# Patient Record
(demographics unavailable — no encounter records)

---

## 2019-01-01 NOTE — PCM.PNNB
- General Info


Date of Service: 19





- Patient Data


Vital Signs: 


 Last Vital Signs











Temp  36.9 C   19 08:00


 


Pulse  142   19 08:00


 


Resp  38   19 08:00


 


BP  56/38   19 11:00


 


Pulse Ox      











Weight: 3.47 kg


I&O Last 24 Hours: 


 Intake & Output











 19





 22:59 06:59 14:59


 


Intake Total  207 


 


Balance  207 











Labs Last 24 Hours: 


 Laboratory Results - last 24 hr











  19 Range/Units





  12:02 12:12 07:14 


 


Sodium     (136-148)  mmol/L


 


Potassium     (3.5-5.1)  mmol/L


 


Chloride     ()  mmol/L


 


Carbon Dioxide     (21.0-32.0)  mmol/L


 


BUN     (7.0-18.0)  mg/dL


 


Creatinine     (0.8-1.3)  mg/dL


 


Est Cr Clr Drug Dosing     


 


Estimated GFR (MDRD)     ml/min


 


Glucose     ()  mg/dL


 


POC Glucose  88 H    (40-80)  mg/dL


 


Calcium     (8.5-10.1)  mg/dL


 


Neonat Total Bilirubin   6.4  7.9  (0.1-12.0)  mg/dL


 


Neonat Direct Bilirubin   0.2  0.1  (0.0-2.0)  mg/dL


 


Neonat Indirect Bili   6.2  7.8  (0.0-10.0)  mg/dL














  19 Range/Units





  07:14 


 


Sodium  144  (136-148)  mmol/L


 


Potassium  4.7  (3.5-5.1)  mmol/L


 


Chloride  110 H  ()  mmol/L


 


Carbon Dioxide  16.0 L  (21.0-32.0)  mmol/L


 


BUN  5 L  (7.0-18.0)  mg/dL


 


Creatinine  0.5 L  (0.8-1.3)  mg/dL


 


Est Cr Clr Drug Dosing  TNP  


 


Estimated GFR (MDRD)  41.4  ml/min


 


Glucose  86  ()  mg/dL


 


POC Glucose   (40-80)  mg/dL


 


Calcium  8.0 L  (8.5-10.1)  mg/dL


 


Neonat Total Bilirubin   (0.1-12.0)  mg/dL


 


Neonat Direct Bilirubin   (0.0-2.0)  mg/dL


 


Neonat Indirect Bili   (0.0-10.0)  mg/dL











Current Medications: 


 Current Medications





Erythromycin (Erythromycin 0.5% Ophth Oint)  1 gm EYEBOTH ONETIME PRN


   PRN Reason: For Delivery


   Last Admin: 19 13:37 Dose:  1 gm


Dextrose/Water (Dextrose 10% In Water)  500 mls @ 8 mls/hr IV ASDIRECTED FEI


Acyclovir 75 mg/ Sodium (Chloride)  12 mls @ 12 mls/hr IV Q8H [facility]


   Last Admin: 19 02:56 Dose:  12 mls/hr


Dextrose/Sodium Chloride (Dextrose 5%-1/4 Ns)  1,000 mls @ 8 mls/hr IV 

ASDIRECTED ONE


   Stop: 19 19:59


   Last Admin: 19 15:53 Dose:  8 mls/hr


Lidocaine HCl (Xylocaine-Mpf 1%)  0 ml INJECT ONETIME PRN


   PRN Reason: Circumcision


Neomycin/Polymyxin/Bacitracin (Triple Antibiotic Oint)  0 gm TOP ASDIRECTED PRN


   PRN Reason: circumcision


Phytonadione (Aquamephyton)  1 mg IM ONETIME PRN


   PRN Reason: For Delivery


   Last Admin: 19 13:44 Dose:  1 mg


Sodium Chloride (Saline Flush)  10 ml FLUSH ASDIRECTED PRN


   PRN Reason: Keep Vein Open


Sodium Chloride (Saline Flush)  2.5 ml FLUSH ASDIRECTED PRN


   PRN Reason: Keep Vein Open


Sodium Chloride (Normal Saline)  10 ml IV ASDIRECTED PRN


   PRN Reason: IV Use


Sucrose (Sweet-Ease Natural)  2 ml PO ASDIRECTED PRN


   PRN Reason: Circimcision





Discontinued Medications





Hepatitis B Vaccine (Recombivax Hb (Pediatric/Adolescent))  5 mcg IM .ONCE ONE


   Stop: 19 11:15


   Last Admin: 19 13:44 Dose:  5 mcg


Acyclovir 75 mg/ Sodium (Chloride)  11.5 mls @ 11.5 mls/hr IV Q8H [facility]


   Last Admin: 19 20:13 Dose:  Not Given


Acyclovir 75 mg/ Sodium (Chloride)  12 mls @ 12 mls/hr IV Q8H [facility]


   Last Admin: 19 20:13 Dose:  Not Given


Acyclovir 75 mg/ Sodium (Chloride)  12 mls @ 12 mls/hr IV Q8H [facility]


   Last Admin: 19 20:14 Dose:  Not Given


Acyclovir 75 mg/ Sodium (Chloride)  12 mls @ 12 mls/hr IV Q8H [facility]











- General/Neuro


Activity: Sleeping


Resting Posture: Flexion





- Exam


Eyes: Bilateral: Normal Inspection


Ears: Normal Appearance, Symmetrical


Nose: Normal Inspection, Normal Mucosa


Mouth: Nnormal Inspection, Palate Intact.  No: Cleft Lip


Chest/Cardiovascular: Normal Appearance, Normal Peripheral Pulses, Regular 

Heart Rate, Symmetrical, Clavicles Intact.  No: Murmur


Respiratory: Lungs Clear, Normal Breath Sounds, No Respiratoy Distress


Abdomen/GI: Normal Bowel Sounds, No Mass, Symmetrical, Soft


Genitalia (Male): Reports: Normal Inspection.  Denies: Undescended Testes, Left

, Undescended Testes, Right


Extremities: Normal Inspection, Normal Capillary Refill, Normal Range of Motion


Skin: Dry, Intact, Warm, Jaundiced (mild)





- Subjective


Note: 





No events overnight. Breastfeeding and taking formula. Voiding and stooling. 





- Problem List & Annotations


(1) Exposure to herpes simplex virus (HSV)


SNOMED Code(s): 912563201241698


   Code(s): Z20.828 - CONTACT W AND EXPOSURE TO OTH VIRAL COMMUNICABLE DISEASES

   Status: Acute   Priority: High   Current Visit: Yes   Onset Date: 19   





(2) Liveborn infant by  delivery


SNOMED Code(s): 763041483, 313920465


   Code(s): Z38.01 - SINGLE LIVEBORN INFANT, DELIVERED BY    Status: 

Acute   Priority: High   Current Visit: Yes   Onset Date: 19   





- Problem List Review


Problem List Initiated/Reviewed/Updated: Yes





- Plan


Plan:: 


Besides routine postpartum care and observation, because of maternal HSV lesion 

being present and rupture of membranes for over 6 hours before delivery, Infant 

will receive HSV cultures of conjunctivae, mouth, nasopharynx, rectum and urine

, and blood HSV PCR.  He will be on IV acyclovir 60 mg/kg/day split into TID 

dosing.  I have discussed this with mother to help her understand that this is 

prophylactic and needed for 3-5 days. 





Plan /3: 


screen blood sugar Q4 ON D10 (notify provider of elevation). If elevated we 

will reduce to d5 1/4 ns at same rate, other wise we will change IVF at 3 pm.   

continue current regimen of IV acyclovir TID split.  No news on culturing 

results at this time.  continue routine cares. 





Plan :


Continue D5 1/4 NS at 55 mL/kg/day. Will decrease rate if possible. Baby is 

feeding well. Normal exam. Bili from yesterday in low risk. Checked BMP today 

normal, will repeat tomorrow given continued IV fluids and check LFTs to look 

for aminotransferase elevation.

## 2019-01-01 NOTE — PCM.PNNB
- General Info


Date of Service: 19





- Patient Data


Vital Signs: 


 Last Vital Signs











Temp  97.8 F   19 08:30


 


Pulse  122   19 08:30


 


Resp  48   19 08:30


 


BP  56/38   19 11:00


 


Pulse Ox      











Weight: 3.47 kg


I&O Last 24 Hours: 


 Intake & Output











 19





 22:59 06:59 14:59


 


Intake Total 40 94 15


 


Balance 40 94 15











Labs Last 24 Hours: 


 Laboratory Results - last 24 hr











  19 Range/Units





  10:30 10:30 13:30 


 


WBC    18.93  (9.0-30.0)  K/uL


 


RBC    5.19  (3.90-7.00)  M/uL


 


Hgb    18.9 H  (5.0-13.0)  g/dL


 


Hct    54.9  (39.0-70.0)  %


 


MCV    105.8  (88.0-123.0)  fL


 


MCH    36.4  (30.0-40.0)  pg


 


MCHC    34.4  (28.0-36.0)  g/dL


 


RDW Std Deviation    61.7  (28.0-62.0)  fl


 


RDW Coeff of Teddy    17 H  (11.0-15.0)  %


 


Plt Count    268  (100-300)  K/uL


 


MPV    10.50  (0..00)  fL


 


Neutrophils % (Manual)    53  (48.0-80.0)  %


 


Band Neutrophils %    7  %


 


Lymphocytes % (Manual)    24  (16.0-40.0)  %


 


Monocytes % (Manual)    11  (2.0-15.0)  %


 


Eosinophils % (Manual)    4  (0.0-7.0)  %


 


Metamyelocytes %    1  %


 


Nucleated RBC %    2.2  /100WBC


 


Absolute Seg Neuts    10.0 H  (1.4-5.7)  


 


Band Neutrophils #    1.3  


 


Lymphocytes # (Manual)    4.5 H  (0.6-2.4)  


 


Monocytes # (Manual)    2.1 H  (0.0-0.8)  


 


Eosinophils # (Manual)    0.8 H  (0.0-0.7)  


 


Absolute Metamyelocyte    0.2  


 


Polychromasia    1+ SLIGHT  


 


Sodium     (136-148)  mmol/L


 


Potassium     (3.5-5.1)  mmol/L


 


Chloride     ()  mmol/L


 


Carbon Dioxide     (21.0-32.0)  mmol/L


 


BUN     (7.0-18.0)  mg/dL


 


Creatinine     (0.8-1.3)  mg/dL


 


Est Cr Clr Drug Dosing     


 


Estimated GFR (MDRD)     ml/min


 


Glucose     ()  mg/dL


 


Calcium     (8.5-10.1)  mg/dL


 


Total Bilirubin     (0.2-12.0)  mg/dL


 


AST     (15-37)  IU/L


 


ALT     (14-63)  IU/L


 


Alkaline Phosphatase     ()  U/L


 


Total Protein     (6.4-8.2)  g/dL


 


Albumin     (3.4-5.0)  g/dL


 


Globulin     (2.6-4.0)  g/dL


 


Albumin/Globulin Ratio     (0.9-1.6)  


 


Cord Blood Type  O POSITIVE    


 


MONY, Poly Interpret   NEGATIVE   (NEGATIVE)  














  19 Range/Units





  13:30 


 


WBC   (9.0-30.0)  K/uL


 


RBC   (3.90-7.00)  M/uL


 


Hgb   (5.0-13.0)  g/dL


 


Hct   (39.0-70.0)  %


 


MCV   (88.0-123.0)  fL


 


MCH   (30.0-40.0)  pg


 


MCHC   (28.0-36.0)  g/dL


 


RDW Std Deviation   (28.0-62.0)  fl


 


RDW Coeff of Teddy   (11.0-15.0)  %


 


Plt Count   (100-300)  K/uL


 


MPV   (0..00)  fL


 


Neutrophils % (Manual)   (48.0-80.0)  %


 


Band Neutrophils %   %


 


Lymphocytes % (Manual)   (16.0-40.0)  %


 


Monocytes % (Manual)   (2.0-15.0)  %


 


Eosinophils % (Manual)   (0.0-7.0)  %


 


Metamyelocytes %   %


 


Nucleated RBC %   /100WBC


 


Absolute Seg Neuts   (1.4-5.7)  


 


Band Neutrophils #   


 


Lymphocytes # (Manual)   (0.6-2.4)  


 


Monocytes # (Manual)   (0.0-0.8)  


 


Eosinophils # (Manual)   (0.0-0.7)  


 


Absolute Metamyelocyte   


 


Polychromasia   


 


Sodium  138  (136-148)  mmol/L


 


Potassium  4.8  (3.5-5.1)  mmol/L


 


Chloride  105  ()  mmol/L


 


Carbon Dioxide  23.2  (21.0-32.0)  mmol/L


 


BUN  11  (7.0-18.0)  mg/dL


 


Creatinine  0.8  (0.8-1.3)  mg/dL


 


Est Cr Clr Drug Dosing  TNP  


 


Estimated GFR (MDRD)  25.9  ml/min


 


Glucose  57 L  ()  mg/dL


 


Calcium  9.9  (8.5-10.1)  mg/dL


 


Total Bilirubin  2.9  (0.2-12.0)  mg/dL


 


AST  50 H  (15-37)  IU/L


 


ALT  18  (14-63)  IU/L


 


Alkaline Phosphatase  129 H  ()  U/L


 


Total Protein  6.5  (6.4-8.2)  g/dL


 


Albumin  3.4  (3.4-5.0)  g/dL


 


Globulin  3.1  (2.6-4.0)  g/dL


 


Albumin/Globulin Ratio  1.1  (0.9-1.6)  


 


Cord Blood Type   


 


MONY, Poly Interpret   (NEGATIVE)  











Current Medications: 


 Current Medications





Erythromycin (Erythromycin 0.5% Ophth Oint)  1 gm EYEBOTH ONETIME PRN


   PRN Reason: For Delivery


   Last Admin: 19 13:37 Dose:  1 gm


Dextrose/Water (Dextrose 10% In Water)  500 mls @ 8 mls/hr IV ASDIRECTED FEI


Acyclovir 75 mg/ Sodium (Chloride)  12 mls @ 12 mls/hr IV Q8H [facility]


   Last Admin: 19 10:48 Dose:  12 mls/hr


Lidocaine HCl (Xylocaine-Mpf 1%)  0 ml INJECT ONETIME PRN


   PRN Reason: Circumcision


Neomycin/Polymyxin/Bacitracin (Triple Antibiotic Oint)  0 gm TOP ASDIRECTED PRN


   PRN Reason: circumcision


Phytonadione (Aquamephyton)  1 mg IM ONETIME PRN


   PRN Reason: For Delivery


   Last Admin: 19 13:44 Dose:  1 mg


Sodium Chloride (Saline Flush)  10 ml FLUSH ASDIRECTED PRN


   PRN Reason: Keep Vein Open


Sodium Chloride (Saline Flush)  2.5 ml FLUSH ASDIRECTED PRN


   PRN Reason: Keep Vein Open


Sodium Chloride (Normal Saline)  10 ml IV ASDIRECTED PRN


   PRN Reason: IV Use


Sucrose (Sweet-Ease Natural)  2 ml PO ASDIRECTED PRN


   PRN Reason: Circimcision





Discontinued Medications





Hepatitis B Vaccine (Recombivax Hb (Pediatric/Adolescent))  5 mcg IM .ONCE ONE


   Stop: 19 11:15


   Last Admin: 19 13:44 Dose:  5 mcg


Acyclovir 75 mg/ Sodium (Chloride)  11.5 mls @ 11.5 mls/hr IV Q8H [facility]


   Last Admin: 19 20:13 Dose:  Not Given


Acyclovir 75 mg/ Sodium (Chloride)  12 mls @ 12 mls/hr IV Q8H [facility]


   Last Admin: 19 20:13 Dose:  Not Given


Acyclovir 75 mg/ Sodium (Chloride)  12 mls @ 12 mls/hr IV Q8H [facility]


   Last Admin: 19 20:14 Dose:  Not Given


Acyclovir 75 mg/ Sodium (Chloride)  12 mls @ 12 mls/hr IV Q8H [facility]











- General/Neuro


Activity: Sleeping


Resting Posture: Flexion





- Exam


Eyes: Bilateral: Normal Inspection


Ears: Normal Appearance, Symmetrical


Nose: Normal Inspection, Normal Mucosa


Mouth: Nnormal Inspection, Palate Intact


Chest/Cardiovascular: Normal Appearance, Normal Peripheral Pulses, Regular 

Heart Rate, Symmetrical


Respiratory: Lungs Clear, Normal Breath Sounds, No Respiratoy Distress


Abdomen/GI: Normal Bowel Sounds, No Mass, Pelvis Stable, Symmetrical, Soft


Genitalia (Male): Reports: Normal Inspection


Extremities: Normal Inspection, Normal Capillary Refill, Normal Range of Motion


Skin: Dry, Intact, Normal Color, Warm





- Subjective


Note: 


Infant has shown no signs of lesions on outer surface of skin. Pt has been 

breastfeedig, voiding and stooling well. 








- Problem List & Annotations


(1) Exposure to herpes simplex virus (HSV)


SNOMED Code(s): 786842878683493


   Code(s): Z20.828 - CONTACT W AND EXPOSURE TO OTH VIRAL COMMUNICABLE DISEASES

   Status: Acute   Priority: High   Current Visit: Yes   Onset Date: 19   





(2) Liveborn infant by  delivery


SNOMED Code(s): 527115077, 866050865


   Code(s): Z38.01 - SINGLE LIVEBORN INFANT, DELIVERED BY    Status: 

Acute   Priority: High   Current Visit: Yes   Onset Date: 19   





- Problem List Review


Problem List Initiated/Reviewed/Updated: Yes





- Plan


Plan:: 


Besides routine postpartum care and observation, because of maternal HSV lesion 

being present and rupture of membranes for over 6 hours before delivery, Infant 

will receive HSV cultures of conjunctivae, mouth, nasopharynx, rectum and urine

, and blood HSV PCR.  He will be on IV acyclovir 60 mg/kg/day split into TID 

dosing.  I have discussed this with mother to help her understand that this is 

prophylactic and needed for 3-5 days. 





Plan 6/3: 


screen blood sugar Q4 ON D10 (notify provider of elevation). If elevated we 

will reduce to d5 1/4 ns at same rate, other wise we will change IVF at 3 pm.   

continue current regimen of IV acyclovir TID split.  No news on culturing 

results at this time.  continue routine cares.

## 2019-01-01 NOTE — PCM.NBDC
Discharge Summary





- Hospital Course


Free Text/Narrative: 





Baby Iain Orr is a healthy  born via  for suspected maternal 

HSV lesion noticed around clitoris after membranes had been ruptured for 

approximately 6 hours. Mother had a history of HSV outbreak in the past and had 

been on prophylactic antirival medication. Subsequently born via  

delivery. Following birth had labs drawn, as well as swabs of mucous membranes, 

HSV PCR testing from the blood, and was started on treatment with acyclovir 

q8h. He remained asymptomatic from a vitals and exam perspective (had a keratin 

bob on foreskin that raised some concern for an HSV vesicle), and his lab 

parameters remained negative. Mucous membrane cultures eventually returned 

negative. The plan was to keep him on acyclovir until the HSV PCR came back, 

however on the evening of , Baby Dominga's IV infiltrated and there was 

difficulty placing a new one. Giving his stable clinical status, a discussion 

with an ID specialist from [facility] was held, and ultimately a discussion 

of risks and benefits with the parents, and a plan was made to observe him off 

of acyclovir (see event note from  for more details). The morning of  his 

vital remained normal, exam remained unchanged, and repeat lab testing was 

unremarkable. He was subsequently discharged. HSV PCR test results came back in 

the evening of  and was ultimately negative. Passed hearing and CHD. 

Bilirubin in low risk zone.  





- Discharge Data


Date of Birth: 19


Delivery Time: 10:29


Discharge Disposition: Home, Self-Care 01


Condition: Good





- Discharge Diagnosis/Problem(s)


(1) Exposure to herpes simplex virus (HSV)


SNOMED Code(s): 206404940825877


   ICD Code: Z20.828 - CONTACT W AND EXPOSURE TO OTH VIRAL COMMUNICABLE 

DISEASES   Status: Resolved   Priority: High   Onset Date: 19   





(2) Liveborn infant by  delivery


SNOMED Code(s): 222550510, 845761589


   ICD Code: Z38.01 - SINGLE LIVEBORN INFANT, DELIVERED BY    Status: 

Acute   Priority: High   Onset Date: 19   





- Discharge Plan


Instructions:  [place] Rashes, Pregnancy and Genital Herpes


Referrals: 


[facility] [Outside]


Gonsalo Denise MD [Physician] - 19 2:00 pm





- Discharge Summary/Plan Comment


Discharge Summary/Plan:: 





Routine follow-up.





[place] Discharge Instructions





- Discharge [place]


Diet: Breastfeeding, Formula


Activity: Don't Co-Sleep w/Infant, Keep Away-Large Crowds, Keep Away-Sick People

, Place on Back to Sleep


Notify Provider of: Fever Over 100.4 Rectally, Diarrhea Over Twice/Day, 

Forceful Vomiting, Refuse 2 or More Feedings, Unusual Rashes, Persistent Crying

, Persistent Irritability, New Jaundice Skin/Eyes, Worse Jaundice Skin/Eyes, No 

Wet Diaper Over 18 Hrs, Circumcision Bleeding, Circumcision Discharge


Go to Emergency Department or Call 911 If: Difficulty Breathing, Infant is 

Lifeless, Infant is Limp, Skin Turns Blue in Color, Skin Turns Pale


Cord Care: Don't Submerge in Tub, Sponge Bathe Only, Leave Dry


OAE Results Left Ear: Pass


OAE Results Right Ear: Pass





 History





- [place] Admission Detail


Date of Service: 19


Infant Delivery Method: Primary 


Infant Delivery Mode: Manual





- Maternal History


Estimated Date of Confinement: 19


: 2


Term: 1


: 0


Abortions: 0


Live Births: 1


Mother's Blood Type: O


Mother's Rh: Negative


Maternal Hepatitis B: Negative


Maternal STD: Positive (Has HSV, has been on valacyclovir prophylaxis.)


Maternal HIV: Negative


Maternal Group Beta Strep/GBS: Negative


Maternal VDRL: Negative


Maternal Urine Toxicology: Negative


Prenatal Care Received: Yes


MD Office Called for Prenatal Records: Yes





- Delivery Data


APGAR Total Score 1 Minute: 9


APGAR Total Score 5 Minutes: 9


Resuscitation Effort: Bulb Suction, Dried and Stimulated, Place in Radiant 

Warmer


 Support Required: Family Practice


Infant Delivery Method: Primary 





[place] Nursery Info & Exam





- Exam


Exam: See Below





- Vital Signs


Vital Signs: 


 Last Vital Signs











Temp  36.7 C   19 04:00


 


Pulse  128   19 04:00


 


Resp  46   19 04:00


 


BP  85/41   19 04:00


 


Pulse Ox      











 Birth Weight: 3.73 kg


Current Weight: 3.54 kg


Height: 50.17 cm





- Nursery Information


Sex, Infant: Male


Cry Description: Normal Pitch


[place] Reflex: Normal Response


Suck Reflex: Normal Response


Head Circumference: 34.29 cm


Abdominal Girth: 34.29 cm


Bed Type: Open Crib


Birth Complications: Herpes Simplex Virus (Possible HSV exposure, receiving 

prophylaxis)





- General/Neuro


Activity: Sleeping


Resting Posture: Flexion





- [place] Scoring


Neuro Posture, NB: Flexion All Limbs


Neuro Square Window: Wrist 30 Degrees


Neuro Arm Recoil: Arm Recoil <90 Degrees


Neuro Popliteal Angle: Popliteal Angle 90 Degrees


Neuro Scarf Sign: Elbow at Same Side


Neuro Heel to Ear: Knee Bent to 90 Heel Reaches 90 Degrees from Prone


Neuro Maturity Score: 20


Physical Skin: Cracking, Pale Areas, Rare Veins


Physical Lanugo: Mostly Bald


Physical Plantar Surface: Creases Anterior 2/3


Physical Breast: Stippled Areola, 1-2 mm Bud


Physical Eye/Ear: Formed and Firm, Instant Recoil


Physical Genitals - Male: Testes Down, Good Rugae


Physical Maturity Score: 18


Maturity Ratin


[place] Additional Comments: 39 weeks





- Physical Exam


Head: Face Symmetrical, Atraumatic, Normocephalic


Eyes: Bilateral: Normal Inspection


Ears: Normal Appearance, Symmetrical


Nose: Normal Inspection, Normal Mucosa


Mouth: Nnormal Inspection, Palate Intact, Cleft Palate (none)


Neck: Normal Inspection, Supple, Trachea Midline


Chest/Cardiovascular: Normal Appearance, Normal Peripheral Pulses, Regular 

Heart Rate, Clavicles Intact, Murmur (none)


Respiratory: Lungs Clear, Normal Breath Sounds, No Respiratoy Distress


Abdomen/GI: Normal Bowel Sounds, No Mass, Symmetrical, Soft


Rectal: Normal Exam


Genitalia (Male): Undescended Testes, Left (none), Undescended Testes, Right (

none)


Spine/Skeletal: Normal Inspection, Normal Range of Motion, Hip Click, Left (none

), Hip Click, Right (none), Sacral Sinus (none)


Extremities: Normal Inspection, Normal Capillary Refill, Normal Range of Motion


Skin: Dry, Intact, Warm, Jaundiced (mild)





 POC Testing





- Congenital Heart Disease Screening


CCHD O2 Saturation, Right Hand: 99


CCHD O2 Saturation, Left Foot: 98


CCHD Screen Result: Pass





- Bilirubin Screening


Delivery Date: 19


Delivery Time: 10:29

## 2019-01-01 NOTE — PCM.PNNB
- General Info


Date of Service: 19





- Patient Data


Vital Signs: 


 Last Vital Signs











Temp  36.5 C   19 08:28


 


Pulse  132   19 08:28


 


Resp  56   19 08:28


 


BP  56/38   19 11:00


 


Pulse Ox      











Weight: 3.54 kg


I&O Last 24 Hours: 


 Intake & Output











 19





 22:59 06:59 14:59


 


Intake Total 90 96 12


 


Balance 90 96 12











Labs Last 24 Hours: 


 Laboratory Results - last 24 hr











  19 Range/Units





  05:30 


 


Sodium  143  (136-148)  mmol/L


 


Potassium  4.4  (3.5-5.1)  mmol/L


 


Chloride  110 H  ()  mmol/L


 


Carbon Dioxide  21.3  (21.0-32.0)  mmol/L


 


BUN  3 L  (7.0-18.0)  mg/dL


 


Creatinine  0.5 L  (0.8-1.3)  mg/dL


 


Est Cr Clr Drug Dosing  TNP  


 


Estimated GFR (MDRD)  41.4  ml/min


 


Glucose  80  ()  mg/dL


 


Calcium  9.3  (8.5-10.1)  mg/dL


 


Total Bilirubin  9.9  (0.2-12.0)  mg/dL


 


AST  69 H  (15-37)  IU/L


 


ALT  24  (14-63)  IU/L


 


Alkaline Phosphatase  101  ()  U/L


 


Total Protein  5.0 L  (6.4-8.2)  g/dL


 


Albumin  2.6 L  (3.4-5.0)  g/dL


 


Globulin  2.4 L  (2.6-4.0)  g/dL


 


Albumin/Globulin Ratio  1.1  (0.9-1.6)  











Micro Last 24 Hours: 


 Microbiology











 19 13:25 Herpes Simplex Virus DNA - Final





 Vesicular Fluid 


 


 19 13:21 Herpes Simplex Virus DNA - Final





 Vesicular Fluid 


 


 19 13:23 Herpes Simplex Virus DNA - Final





 Vesicular Fluid 


 


 19 13:18 Herpes Simplex Virus DNA - Final





 Vesicular Fluid 


 


 19 13:20 Herpes Simplex Virus DNA - Final





 Buccal Smear 











Current Medications: 


 Current Medications





Erythromycin (Erythromycin 0.5% Ophth Oint)  1 gm EYEBOTH ONETIME PRN


   PRN Reason: For Delivery


   Last Admin: 19 13:37 Dose:  1 gm


Acyclovir 75 mg/ Sodium (Chloride)  12 mls @ 12 mls/hr IV Q8H [facility]


   Last Admin: 19 10:46 Dose:  12 mls/hr


Dextrose/Sodium Chloride (Dextrose 5%-1/4 Ns)  1,000 mls @ 8 mls/hr IV 

ASDIRECTED PRN


   PRN Reason: tachypnea


   Last Admin: 19 17:35 Dose:  8 mls/hr


Lidocaine HCl (Xylocaine-Mpf 1%)  0 ml INJECT ONETIME PRN


   PRN Reason: Circumcision


Neomycin/Polymyxin/Bacitracin (Triple Antibiotic Oint)  0 gm TOP ASDIRECTED PRN


   PRN Reason: circumcision


Phytonadione (Aquamephyton)  1 mg IM ONETIME PRN


   PRN Reason: For Delivery


   Last Admin: 19 13:44 Dose:  1 mg


Sodium Chloride (Saline Flush)  10 ml FLUSH ASDIRECTED PRN


   PRN Reason: Keep Vein Open


Sodium Chloride (Saline Flush)  2.5 ml FLUSH ASDIRECTED PRN


   PRN Reason: Keep Vein Open


Sodium Chloride (Normal Saline)  10 ml IV ASDIRECTED PRN


   PRN Reason: IV Use


Sucrose (Sweet-Ease Natural)  2 ml PO ASDIRECTED PRN


   PRN Reason: Circimcision





Discontinued Medications





Hepatitis B Vaccine (Recombivax Hb (Pediatric/Adolescent))  5 mcg IM .ONCE ONE


   Stop: 19 11:15


   Last Admin: 19 13:44 Dose:  5 mcg


Dextrose/Water (Dextrose 10% In Water)  500 mls @ 8 mls/hr IV ASDIRECTED FEI


Acyclovir 75 mg/ Sodium (Chloride)  11.5 mls @ 11.5 mls/hr IV Q8H [facility]


   Last Admin: 19 20:13 Dose:  Not Given


Acyclovir 75 mg/ Sodium (Chloride)  12 mls @ 12 mls/hr IV Q8H [facility]


   Last Admin: 19 20:13 Dose:  Not Given


Acyclovir 75 mg/ Sodium (Chloride)  12 mls @ 12 mls/hr IV Q8H [facility]


   Last Admin: 19 20:14 Dose:  Not Given


Acyclovir 75 mg/ Sodium (Chloride)  12 mls @ 12 mls/hr IV Q8H FEI


Dextrose/Sodium Chloride (Dextrose 5%-1/4 Ns)  1,000 mls @ 8 mls/hr IV 

ASDIRECTED ONE


   Stop: 19 19:59


   Last Admin: 19 15:53 Dose:  8 mls/hr











- General/Neuro


Activity: Sleeping


Resting Posture: Flexion





- Exam


Eyes: Bilateral: Normal Inspection, Red Reflex, Positive


Ears: Normal Appearance, Symmetrical


Nose: Normal Inspection, Normal Mucosa


Mouth: Nnormal Inspection, Palate Intact


Chest/Cardiovascular: Normal Appearance, Normal Peripheral Pulses, Regular 

Heart Rate, Symmetrical, Clavicles Intact.  No: Murmur


Respiratory: Lungs Clear, Normal Breath Sounds, No Respiratoy Distress


Abdomen/GI: Normal Bowel Sounds, No Mass, Symmetrical, Soft


Extremities: Normal Inspection, Normal Capillary Refill, Normal Range of Motion


Skin: Dry, Intact, Warm, Jaundiced





- Subjective


Note: 





No events overnight. Tolerating IV well plus EBM and formula. Voiding and 

stooling. No vomiting.





- Problem List & Annotations


(1) Exposure to herpes simplex virus (HSV)


SNOMED Code(s): 252496587868416


   Code(s): Z20.828 - CONTACT W AND EXPOSURE TO OTH VIRAL COMMUNICABLE DISEASES

   Status: Acute   Priority: High   Current Visit: Yes   Onset Date: 19   





(2) Liveborn infant by  delivery


SNOMED Code(s): 957293197, 064917058


   Code(s): Z38.01 - SINGLE LIVEBORN INFANT, DELIVERED BY    Status: 

Acute   Priority: High   Current Visit: Yes   Onset Date: 19   





- Problem List Review


Problem List Initiated/Reviewed/Updated: Yes





- My Orders


Last 24 Hours: 


My Active Orders





19 16:30


Dextrose 5 %-0.2 % NaCl [Dextrose 5%-/4 NS] 1,000 ml IV ASDIRECTED 














- Plan


Plan:: 


Besides routine postpartum care and observation, because of maternal HSV lesion 

being present and rupture of membranes for over 6 hours before delivery, Infant 

will receive HSV cultures of conjunctivae, mouth, nasopharynx, rectum and urine

, and blood HSV PCR.  He will be on IV acyclovir 60 mg/kg/day split into TID 

dosing.  I have discussed this with mother to help her understand that this is 

prophylactic and needed for 3-5 days. 





Plan 6/3: 


screen blood sugar Q4 ON D10 (notify provider of elevation). If elevated we 

will reduce to d5 1/4 ns at same rate, other wise we will change IVF at 3 pm.   

continue current regimen of IV acyclovir TID split.  No news on culturing 

results at this time.  continue routine cares. 





Plan 6/:


Continue D5 1/4 NS at 55 mL/kg/day. Will decrease rate if possible. Baby is 

feeding well. Normal exam. Bili from yesterday in low risk. Checked BMP today 

normal, will repeat tomorrow given continued IV fluids and check LFTs to look 

for aminotransferase elevation. 





Plan 6:


Continue acyclovir and D5 1/4 NS; maintain IVF rate to ecourage adequate 

intravenous volume in setting of acyclovir. Normal vitals. Exam unchanged. Had 

lesion on penis that was unroofed yesterday and sent for HSV culture. Solitary 

lesion, no surrounding erythema, non-tender, suspect keratin papule more than 

HSV lesion. HSV swabs negative, awaiting results of serum PCR. Mildly elevated 

AST, suspect from normal  hemolysis rather than viral dissemination to 

the liver.

## 2019-01-01 NOTE — PCM.SN
- Free Text/Narrative


Note: 





Requested to start IV on (today).  Attempts X3/staff.  24 gauge R hand 

on attempt #1.  Secure, flushed well.


Prepped with alcohol x3.


Tolerated well

## 2019-01-01 NOTE — PCM.SN
- Free Text/Narrative


Note: 





Event note:





Paged by RN around 8pm to be notified that Abdifatah Orr's IV had infiltrated 

while running acyclovir. Came to bedside to evaluate. Right arm with very mild 

swelling. No visible tissue damage. Palpable brachial pulses.





IV replacement was attempted by RNs Chasity and Nathaly and were unsuccessful. 





I reviewed case with Dr. Espinoza, pediatric infectious disease, from [facility]. I explained the details of the case: the baby's mother had a history of 

HSV and was on valacyclovir suppression leading up to the time of delivery, 

that membranes were ruptured for approximately 6 hours before the 

identification of a possible vesicular lesion on the clitoral ball, and at that 

point that the baby was transitioned to a  delivery. Furthermore I 

informed him that since birth, the baby has been on acyclovir, and has done 

well with normal vital signs, normal physical examination, normal lab indices, 

and negative mucous membrane surface cultures, but that we were still waiting 

on an HSV PCR from the baby's blood taken at time of birth. Lastly I informed 

him of the dynamics of the parents and the hospital, that they are very 

interested in taking the baby home and that this is leading to stress between 

them and the healthcare team.





Given that this HSV lesion from the mother represents a secondary outbreak, and 

the baby has no manifestations of BG or disseminated HSV, and that the surface 

cultures are negative, his thought was that it is reasonable to observe the 

baby off of acyclovir if we are having a difficult time replacing the IV. I had 

a discussion with the parents regarding the risks and benefits of replacing the 

IV in order to continue the acyclovir and that is the safest option until 

obtaining the HSV PCR result from the blood, as well as the risks and benefits 

of observing the baby off of acyclovir, monitoring the vital signs closely, and 

if there are any abnormalities or alarm signs we can replace the IV at that 

time and resume the acyclovir, knowing that this is risky and the consequences 

could be very severe, including brain damage and death, as we had discussed 

earlier in the day.





At this point in time the parents are opting in favor of the latter option, 

observing off of acyclovir. Discussed with ANDREW Gaspar, we will watch the vital 

signs every 4 hours, and if she notices any alarming signs or symptoms she will 

reach out to me immediately to re-evaluate.





Gonsalo Denise MD


Pediatric Hospitalist

## 2019-01-01 NOTE — PCM.NBADM
History





- [place] Admission Detail


Date of Service: 19


 Admission Detail: 


On 2019, this 3730 g 8# 4Oz male was delivered by primary unscheduled C-

sec for maternal HSV.  Upon exam  with mother in labor today, Dr. Finn found 

a suspicious lesion on the clitoral ball and performed a C-sec.  Mother has 

been on valacyclovir and had ruptured membranes over 6 hours before the C-

section.  INfant had apgar 9/9  and mother was 38+ 5.   





Infant Delivery Method: Primary 


Infant Delivery Mode: Manual





- Maternal History


Estimated Date of Confinement: 19


: 2


Live Births: 1


Mother's Blood Type: O


Mother's Rh: Negative


Maternal Hepatitis B: Negative


Maternal STD: Positive (Has HSV, has been on valacyclovir prophylaxis.)


Maternal HIV: Negative


Maternal Group Beta Strep/GBS: Negative


Maternal VDRL: Negative


Maternal Urine Toxicology: Negative


Prenatal Care Received: Yes


MD Office Called for Prenatal Records: Yes





- Delivery Data


APGAR Total Score 1 Minute: 9


APGAR Total Score 5 Minutes: 9


Resuscitation Effort: Bulb Suction, Dried and Stimulated, Place in Radiant 

Warmer


[place] Support Required: Family Practice


Infant Delivery Method: Primary 





 Nursery Information


Gestation Age (Weeks,Days): Weeks (38), Days (5)


Sex, Infant: Male


Weight: 3.742 kg


Length: 50.17 cm


Cry Description: Normal Pitch


Umu Reflex: Normal Response


Suck Reflex: Normal Response


Head Circumference: 50.17 cm


Abdominal Girth: 34.29 cm


Bed Type: Open Crib


Birth Complications: Herpes Simplex Virus (Possible HSV exposure, receiving 

prophylaxis)





[place] Physician Exam





- Exam


Exam: See Below


Activity: Active


Resting Posture: Flexion


Head: Face Symmetrical, Atraumatic, Normocephalic, Other (NO scalp electrode or 

lacerations)


Eyes: Bilateral: Normal Inspection, Red Reflex, Positive


Ears: Normal Appearance, Symmetrical


Nose: Normal Inspection, Normal Mucosa


Mouth: Nnormal Inspection, Palate Intact, Lisandro's Pearls


Neck: Normal Inspection, Supple, Trachea Midline


Chest/Cardiovascular: Normal Appearance, Normal Peripheral Pulses, Regular 

Heart Rate, Symmetrical, Clavicles Intact.  No: Murmur


Respiratory: Lungs Clear, Normal Breath Sounds, No Respiratoy Distress


Abdomen/GI: Normal Bowel Sounds, No Mass, Symmetrical, Soft


Rectal: Normal Exam


Genitalia (Male): Normal Inspection


Spine/Skeletal: Normal Inspection, Normal Range of Motion


Extremities: Normal Inspection, Normal Capillary Refill, Normal Range of Motion


Skin: Dry, Intact, Normal Color, Warm, Other (No rash was seen)





[place] Assessment and Plan


(1) Liveborn infant by  delivery


SNOMED Code(s): 995816140, 934460026


   Code(s): Z38.01 - SINGLE LIVEBORN INFANT, DELIVERED BY    Status: 

Acute   Priority: High   Current Visit: Yes   Onset Date: 19   





(2) Exposure to herpes simplex virus (HSV)


SNOMED Code(s): 265854547688835


   Code(s): Z20.828 - CONTACT W AND EXPOSURE TO OTH VIRAL COMMUNICABLE DISEASES

   Status: Acute   Priority: High   Current Visit: Yes   Onset Date: 19   


Problem List Initiated/Reviewed/Updated: Yes


Orders (Last 24 Hours): 


 Active Orders 24 hr











 Category Date Time Status


 


 Patient Status [ADT] Routine ADT  19 11:14 Ordered


 


 Blood Glucose Check, Bedside [RC] ONETIME Care  19 11:14 Ordered


 


  Hearing Screen [RC] ROUTINE Care  19 11:14 Ordered


 


 [place] Intake and Output [RC] QSHIFT Care  19 11:14 Ordered


 


 Notify Provider [RC] PRN Care  19 11:14 Ordered


 


 Oxygen Therapy [RC] ASDIRECTED Care  19 11:14 Ordered


 


 Vaccines to be Administered [RC] PER UNIT ROUTINE Care  19 11:17 Ordered


 


 Verify Patient Consent Obtain [RC] ASDIRECTED Care  19 11:14 Ordered


 


 Vital Measures,  [RC] Per Unit Routine Care  19 11:14 Ordered


 


 BILIRUBIN,  PROFILE [CHEM] Routine Lab  19 11:14 Ordered


 


 CBC WITH MANUAL DIFF [HEME] Urgent Lab  19 11:59 Ordered


 


 COMPREHENSIVE METABOLIC PN,CMP [CHEM] Routine Lab  19 12:00 Ordered


 


 HSV CULTURE [MREF] Urgent Lab  19 11:33 Ordered


 


  SCREENING (STATE) [POC] Routine Lab  19 11:14 Ordered


 


 Acyclovir [Zovirax] 75 mg Med  19 12:15 Ordered





 Sodium Chloride 0.9% [Normal Saline] 10 ml   





 IV Q8H   


 


 Bacitracin/Neomycin/Polymyxin [Triple Antibiotic Oint] Med  19 11:14 

Ordered





 See Dose Instructions  TOP ASDIRECTED PRN   


 


 Dextrose 10% in Water 500 ml Med  19 12:15 Ordered





 IV ASDIRECTED   


 


 Erythromycin Base [Erythromycin 0.5% Ophth Oint] Med  19 11:14 Ordered





 1 gm EYEBOTH ONETIME PRN   


 


 Lidocaine 1% [Xylocaine-MPF 1%] Med  19 11:14 Ordered





 See Dose Instructions  INJECT ONETIME PRN   


 


 Phytonadione [AquaMephyton] Med  19 11:14 Ordered





 1 mg IM ONETIME PRN   


 


 Sodium Chloride 0.9% [Normal Saline] Med  19 11:59 Ordered





 10 ml IV ASDIRECTED PRN   


 


 Sodium Chloride 0.9% [Saline Flush] Med  19 11:59 Ordered





 10 ml FLUSH ASDIRECTED PRN   


 


 Sodium Chloride 0.9% [Saline Flush] Med  19 11:59 Ordered





 2.5 ml FLUSH ASDIRECTED PRN   


 


 Sucrose [Sweet-Ease Natural] Med  19 11:14 Ordered





 2 ml PO ASDIRECTED PRN   


 


 Peripheral IV Insertion Pediatric [OM.PC] Urgent Oth  19 12:01 Ordered


 


 Resuscitation Status Routine Resus Stat  19 11:14 Ordered








 Medication Orders





Erythromycin (Erythromycin 0.5% Ophth Oint)  1 gm EYEBOTH ONETIME PRN


   PRN Reason: For Delivery


Dextrose/Water (Dextrose 10% In Water)  500 mls @ 8 mls/hr IV ASDIRECTED FEI


Acyclovir 75 mg/ Sodium (Chloride)  10 mls @ 100 mls/hr IV Q8H FEI


Lidocaine HCl (Xylocaine-Mpf 1%)  0 ml INJECT ONETIME PRN


   PRN Reason: Circumcision


Neomycin/Polymyxin/Bacitracin (Triple Antibiotic Oint)  0 gm TOP ASDIRECTED PRN


   PRN Reason: circumcision


Phytonadione (Aquamephyton)  1 mg IM ONETIME PRN


   PRN Reason: For Delivery


Sodium Chloride (Saline Flush)  10 ml FLUSH ASDIRECTED PRN


   PRN Reason: Keep Vein Open


Sodium Chloride (Saline Flush)  2.5 ml FLUSH ASDIRECTED PRN


   PRN Reason: Keep Vein Open


Sodium Chloride (Normal Saline)  10 ml IV ASDIRECTED PRN


   PRN Reason: IV Use


Sucrose (Sweet-Ease Natural)  2 ml PO ASDIRECTED PRN


   PRN Reason: Circimcision








Plan: 


Besides routine postpartum care and observation, because of maternal HSV lesion 

being present and rupture of membranes for over 6 hours before delivery, Infant 

will receive HSV cultures of conjunctivae, mouth, nasopharynx, rectum and urine

, and blood HSV PCR.  He will be on IV acyclovir 60 mg/kg/day split into TID 

dosing.  I have discussed this with mother to help her understand that this is 

prophylactic and needed for 3-5 days.